# Patient Record
Sex: FEMALE | Race: WHITE
[De-identification: names, ages, dates, MRNs, and addresses within clinical notes are randomized per-mention and may not be internally consistent; named-entity substitution may affect disease eponyms.]

---

## 2018-03-12 ENCOUNTER — HOSPITAL ENCOUNTER (EMERGENCY)
Dept: HOSPITAL 54 - ER | Age: 83
LOS: 1 days | Discharge: HOME | End: 2018-03-13
Payer: MEDICARE

## 2018-03-12 VITALS — HEIGHT: 62 IN | BODY MASS INDEX: 25.76 KG/M2 | WEIGHT: 140 LBS

## 2018-03-12 DIAGNOSIS — R05: Primary | ICD-10-CM

## 2018-03-12 LAB
BASOPHILS # BLD AUTO: 0 /CMM (ref 0–0.2)
BASOPHILS NFR BLD AUTO: 0.2 % (ref 0–2)
BUN SERPL-MCNC: 12 MG/DL (ref 7–18)
CALCIUM SERPL-MCNC: 8.4 MG/DL (ref 8.5–10.1)
CHLORIDE SERPL-SCNC: 96 MMOL/L (ref 98–107)
CO2 SERPL-SCNC: 29 MMOL/L (ref 21–32)
CREAT SERPL-MCNC: 0.7 MG/DL (ref 0.6–1.3)
EOSINOPHIL # BLD AUTO: 0 /CMM (ref 0–0.7)
EOSINOPHIL NFR BLD AUTO: 0.4 % (ref 0–6)
GLUCOSE SERPL-MCNC: 137 MG/DL (ref 74–106)
HCT VFR BLD AUTO: 34 % (ref 33–45)
HGB BLD-MCNC: 11.6 G/DL (ref 11.5–14.8)
LYMPHOCYTES NFR BLD AUTO: 1.2 /CMM (ref 0.8–4.8)
LYMPHOCYTES NFR BLD AUTO: 14.7 % (ref 20–44)
MCH RBC QN AUTO: 28 PG (ref 26–33)
MCHC RBC AUTO-ENTMCNC: 34 G/DL (ref 31–36)
MCV RBC AUTO: 81 FL (ref 82–100)
MONOCYTES NFR BLD AUTO: 0.8 /CMM (ref 0.1–1.3)
MONOCYTES NFR BLD AUTO: 9.9 % (ref 2–12)
NEUTROPHILS # BLD AUTO: 6.2 /CMM (ref 1.8–8.9)
NEUTROPHILS NFR BLD AUTO: 74.8 % (ref 43–81)
PLATELET # BLD AUTO: 219 /CMM (ref 150–450)
POTASSIUM SERPL-SCNC: 3.7 MMOL/L (ref 3.5–5.1)
RBC # BLD AUTO: 4.19 MIL/UL (ref 4–5.2)
RDW COEFFICIENT OF VARIATION: 12.9 (ref 11.5–15)
SODIUM SERPL-SCNC: 133 MMOL/L (ref 136–145)
WBC NRBC COR # BLD AUTO: 8.3 K/UL (ref 4.3–11)

## 2018-03-12 PROCEDURE — Z7610: HCPCS

## 2018-03-12 PROCEDURE — 71045 X-RAY EXAM CHEST 1 VIEW: CPT

## 2018-03-12 PROCEDURE — 85025 COMPLETE CBC W/AUTO DIFF WBC: CPT

## 2018-03-12 PROCEDURE — 36415 COLL VENOUS BLD VENIPUNCTURE: CPT

## 2018-03-12 PROCEDURE — 80048 BASIC METABOLIC PNL TOTAL CA: CPT

## 2018-03-12 PROCEDURE — 87804 INFLUENZA ASSAY W/OPTIC: CPT

## 2018-03-12 PROCEDURE — 99285 EMERGENCY DEPT VISIT HI MDM: CPT

## 2018-03-12 PROCEDURE — A4606 OXYGEN PROBE USED W OXIMETER: HCPCS

## 2018-03-12 NOTE — NUR
BIB [facility], C/O OF SOB, COUGH FOR 3 WEEKS, NAD NOTED, VSS, RESP EVEN AND 
UNLABORED, PT IS ON OXYGEN VIA NC 2LPM, SAT 99%,  PT WAS PUT ON GOWN AND 
MONITOR, MD AT .

## 2018-03-13 VITALS — SYSTOLIC BLOOD PRESSURE: 135 MMHG | DIASTOLIC BLOOD PRESSURE: 41 MMHG

## 2019-01-28 ENCOUNTER — HOSPITAL ENCOUNTER (EMERGENCY)
Dept: HOSPITAL 54 - ER | Age: 84
Discharge: HOME | End: 2019-01-28
Payer: MEDICARE

## 2019-01-28 VITALS — BODY MASS INDEX: 23.3 KG/M2 | HEIGHT: 66 IN | WEIGHT: 145 LBS

## 2019-01-28 VITALS — SYSTOLIC BLOOD PRESSURE: 158 MMHG | DIASTOLIC BLOOD PRESSURE: 72 MMHG

## 2019-01-28 DIAGNOSIS — R07.89: ICD-10-CM
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
## 2019-01-28 DIAGNOSIS — S01.01XA: Primary | ICD-10-CM
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
## 2019-01-28 DIAGNOSIS — R55: ICD-10-CM
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
## 2019-01-28 LAB
BASOPHILS # BLD AUTO: 0.1 /CMM (ref 0–0.2)
BASOPHILS NFR BLD AUTO: 0.8 % (ref 0–2)
BUN SERPL-MCNC: 19 MG/DL (ref 7–18)
CALCIUM SERPL-MCNC: 8.4 MG/DL (ref 8.5–10.1)
CHLORIDE SERPL-SCNC: 102 MMOL/L (ref 98–107)
CO2 SERPL-SCNC: 29 MMOL/L (ref 21–32)
CREAT SERPL-MCNC: 0.8 MG/DL (ref 0.6–1.3)
EOSINOPHIL NFR BLD AUTO: 0.4 % (ref 0–6)
GLUCOSE SERPL-MCNC: 140 MG/DL (ref 74–106)
HCT VFR BLD AUTO: 38 % (ref 33–45)
HGB BLD-MCNC: 12.5 G/DL (ref 11.5–14.8)
LYMPHOCYTES NFR BLD AUTO: 2 /CMM (ref 0.8–4.8)
LYMPHOCYTES NFR BLD AUTO: 21.4 % (ref 20–44)
MCHC RBC AUTO-ENTMCNC: 32 G/DL (ref 31–36)
MCV RBC AUTO: 83 FL (ref 82–100)
MONOCYTES NFR BLD AUTO: 0.8 /CMM (ref 0.1–1.3)
MONOCYTES NFR BLD AUTO: 7.9 % (ref 2–12)
NEUTROPHILS # BLD AUTO: 6.6 /CMM (ref 1.8–8.9)
NEUTROPHILS NFR BLD AUTO: 69.5 % (ref 43–81)
PLATELET # BLD AUTO: 189 /CMM (ref 150–450)
POTASSIUM SERPL-SCNC: 4.4 MMOL/L (ref 3.5–5.1)
RBC # BLD AUTO: 4.61 MIL/UL (ref 4–5.2)
SODIUM SERPL-SCNC: 139 MMOL/L (ref 136–145)
WBC NRBC COR # BLD AUTO: 9.5 K/UL (ref 4.3–11)

## 2019-01-28 PROCEDURE — 70450 CT HEAD/BRAIN W/O DYE: CPT

## 2019-01-28 PROCEDURE — 12002 RPR S/N/AX/GEN/TRNK2.6-7.5CM: CPT

## 2019-01-28 PROCEDURE — 84484 ASSAY OF TROPONIN QUANT: CPT

## 2019-01-28 PROCEDURE — 90471 IMMUNIZATION ADMIN: CPT

## 2019-01-28 PROCEDURE — 99284 EMERGENCY DEPT VISIT MOD MDM: CPT

## 2019-01-28 PROCEDURE — 90715 TDAP VACCINE 7 YRS/> IM: CPT

## 2019-01-28 PROCEDURE — 80048 BASIC METABOLIC PNL TOTAL CA: CPT

## 2019-01-28 PROCEDURE — 93005 ELECTROCARDIOGRAM TRACING: CPT

## 2019-01-28 PROCEDURE — 71045 X-RAY EXAM CHEST 1 VIEW: CPT

## 2019-01-28 PROCEDURE — 85025 COMPLETE CBC W/AUTO DIFF WBC: CPT

## 2019-01-28 PROCEDURE — 36415 COLL VENOUS BLD VENIPUNCTURE: CPT

## 2019-01-28 PROCEDURE — 85730 THROMBOPLASTIN TIME PARTIAL: CPT

## 2019-01-28 PROCEDURE — A6402 STERILE GAUZE <= 16 SQ IN: HCPCS

## 2019-01-28 NOTE — NUR
PT BIB [facility] WITH A C/O SLIP AND FALL IN THE BATHTUB. PT HAS A LAC ON BACK OF 
HEAD. - KO. PT'S CAREGIVER STATED THAT THE PT WAS WASHED AND DRESSED. CAREGIVER 
WAS DRYING PT'S HAIR, WHEN THE PHONE RANG. THE CAREGIVER STEPPED AWAY FOR A 
MOMENT TO GET THE PHONE AND THE PT TRIED TO GET INTO THE BATH AND SLIPPED. PT 
DENIES ANY NECK OR BACK PAIN. PT IS ABLE TO MOVE ALL EXTREMITIES. NO SHORTENING 
NOTED. PT IS SLIGHTLY HYPERTENSIVE.

## 2019-01-28 NOTE — NUR
PT LEFT VIA WC. VSS. NAD NOTED. NO EMESIS NOTED. Patient discharged to home in 
stable condition. Written and verbal after care instructions given. Patient's 
daughter verbalizes understanding of instruction.

## 2019-10-18 ENCOUNTER — HOSPITAL ENCOUNTER (INPATIENT)
Dept: HOSPITAL 54 - ER | Age: 84
LOS: 4 days | Discharge: NURSING, REHAB OR LONG-TERM CARE | DRG: 193 | End: 2019-10-22
Attending: FAMILY MEDICINE | Admitting: NURSE PRACTITIONER
Payer: MEDICARE

## 2019-10-18 VITALS — HEIGHT: 61 IN | WEIGHT: 157 LBS | BODY MASS INDEX: 29.64 KG/M2

## 2019-10-18 DIAGNOSIS — N39.0: ICD-10-CM

## 2019-10-18 DIAGNOSIS — R50.9: ICD-10-CM

## 2019-10-18 DIAGNOSIS — D72.829: ICD-10-CM

## 2019-10-18 DIAGNOSIS — J15.9: Primary | ICD-10-CM

## 2019-10-18 DIAGNOSIS — K21.9: ICD-10-CM

## 2019-10-18 DIAGNOSIS — Z87.891: ICD-10-CM

## 2019-10-18 DIAGNOSIS — F03.90: ICD-10-CM

## 2019-10-18 DIAGNOSIS — B96.20: ICD-10-CM

## 2019-10-18 DIAGNOSIS — J45.909: ICD-10-CM

## 2019-10-18 DIAGNOSIS — I10: ICD-10-CM

## 2019-10-18 DIAGNOSIS — G93.41: ICD-10-CM

## 2019-10-18 DIAGNOSIS — E44.1: ICD-10-CM

## 2019-10-18 DIAGNOSIS — E88.09: ICD-10-CM

## 2019-10-18 DIAGNOSIS — B95.1: ICD-10-CM

## 2019-10-18 DIAGNOSIS — E03.9: ICD-10-CM

## 2019-10-18 PROCEDURE — G0378 HOSPITAL OBSERVATION PER HR: HCPCS

## 2019-10-18 PROCEDURE — A4216 STERILE WATER/SALINE, 10 ML: HCPCS

## 2019-10-18 NOTE — NUR
TITO FROM HOME. TO ER BED 4. ROCKY AT BEDSIDE FOR INFORMATION. PT IS 
ALERT AND AWAKE. BROUGHT IN FOR C/O SOB. ACCORDING TO ROCKY PT HAS BEEN 
HAVING SOB AND CNOPN PRODUCTIVE COUGH FOR THE PAST 4-5 DAYS. ROCKY REPORTS 
THAT SHE HAS BEEN GIVING THE NEBULIZER BREATHIGNTX BUT IS NOT EFFECTIVE. NO 
RESP DISTRESS NOTED DURING ASSESSMENT, SATTING 100% ON RA, BREATHING EVEN AND 
UNLABORED. PT IS FEBRILE @ 101.9  AWAITING MD FOR EVAL.

## 2019-10-18 NOTE — NUR
-------------------------------------------------------------------------------

          *** Note undone in EDM - 10/18/19 at 2339 by BEAR ***          

-------------------------------------------------------------------------------

BIBFAMILY FROM HOME. TO ER BED 4. ROCKY AT BEDSIDE FOR INFORMATION. PT IS 
ALERT AND AWAKE. BROUGHT IN FOR C/O SOB. ACCORDING TO ROCKY PT HAS BEEN 
HAVING SOB AND CNOPN PRODUCTIVE COUGH FOR THE PAST 4-5 DAYS. ROCKY REPORTS 
THAT SHE HAS BEEN GIVING THE NEBULIZER BREATHIGNTX BUT IS NOT EFFECTIVE. NO 
RESP DISTRESS NOTED DURING ASSESSMENT, SATTING 100% ON RA, BREATHING EVEN AND 
UNLABORED. AWAITING MD FOR EVAL.

## 2019-10-18 NOTE — NUR
TITO FROM HOME. TO ER BED 4. ROCKY AT BEDSIDE FOR INFORMATION. PT IS 
ALERT AND AWAKE. BROUGHT IN FOR C/O SOB. ACCORDING TO ROCKY PT HAS BEEN 
HAVING SOB AND CNOPN PRODUCTIVE COUGH FOR THE PAST 4-5 DAYS. ROCKY REPORTS 
THAT SHE HAS BEEN GIVING THE NEBULIZER BREATHIGNTX BUT IS NOT EFFECTIVE. NO 
RESP DISTRESS NOTED DURING ASSESSMENT, SATTING 100% ON RA, BREATHING EVEN AND 
UNLABORED. AWAITING MD FOR EVAL.

## 2019-10-19 VITALS — DIASTOLIC BLOOD PRESSURE: 70 MMHG | SYSTOLIC BLOOD PRESSURE: 129 MMHG

## 2019-10-19 VITALS — DIASTOLIC BLOOD PRESSURE: 79 MMHG | SYSTOLIC BLOOD PRESSURE: 142 MMHG

## 2019-10-19 VITALS — SYSTOLIC BLOOD PRESSURE: 142 MMHG | DIASTOLIC BLOOD PRESSURE: 78 MMHG

## 2019-10-19 VITALS — SYSTOLIC BLOOD PRESSURE: 155 MMHG | DIASTOLIC BLOOD PRESSURE: 80 MMHG

## 2019-10-19 VITALS — DIASTOLIC BLOOD PRESSURE: 80 MMHG | SYSTOLIC BLOOD PRESSURE: 155 MMHG

## 2019-10-19 VITALS — DIASTOLIC BLOOD PRESSURE: 78 MMHG | SYSTOLIC BLOOD PRESSURE: 142 MMHG

## 2019-10-19 VITALS — DIASTOLIC BLOOD PRESSURE: 76 MMHG | SYSTOLIC BLOOD PRESSURE: 138 MMHG

## 2019-10-19 VITALS — SYSTOLIC BLOOD PRESSURE: 129 MMHG | DIASTOLIC BLOOD PRESSURE: 70 MMHG

## 2019-10-19 LAB
ALBUMIN SERPL BCP-MCNC: 2.9 G/DL (ref 3.4–5)
ALP SERPL-CCNC: 95 U/L (ref 46–116)
ALT SERPL W P-5'-P-CCNC: 11 U/L (ref 12–78)
APPEARANCE UR: (no result)
AST SERPL W P-5'-P-CCNC: 17 U/L (ref 15–37)
BASOPHILS # BLD AUTO: 0 /CMM (ref 0–0.2)
BASOPHILS # BLD AUTO: 0.2 /CMM (ref 0–0.2)
BASOPHILS NFR BLD AUTO: 0.2 % (ref 0–2)
BASOPHILS NFR BLD AUTO: 1.1 % (ref 0–2)
BILIRUB DIRECT SERPL-MCNC: 0.1 MG/DL (ref 0–0.2)
BILIRUB SERPL-MCNC: 0.4 MG/DL (ref 0.2–1)
BILIRUB UR QL STRIP: NEGATIVE
BUN SERPL-MCNC: 14 MG/DL (ref 7–18)
BUN SERPL-MCNC: 17 MG/DL (ref 7–18)
CALCIUM SERPL-MCNC: 8.8 MG/DL (ref 8.5–10.1)
CALCIUM SERPL-MCNC: 8.8 MG/DL (ref 8.5–10.1)
CHLORIDE SERPL-SCNC: 103 MMOL/L (ref 98–107)
CHLORIDE SERPL-SCNC: 103 MMOL/L (ref 98–107)
CHOLEST SERPL-MCNC: 154 MG/DL (ref ?–200)
CO2 SERPL-SCNC: 27 MMOL/L (ref 21–32)
CO2 SERPL-SCNC: 28 MMOL/L (ref 21–32)
COLOR UR: YELLOW
CREAT SERPL-MCNC: 0.8 MG/DL (ref 0.6–1.3)
CREAT SERPL-MCNC: 0.9 MG/DL (ref 0.6–1.3)
EOSINOPHIL NFR BLD AUTO: 0 % (ref 0–6)
EOSINOPHIL NFR BLD AUTO: 1.2 % (ref 0–6)
GLUCOSE SERPL-MCNC: 125 MG/DL (ref 74–106)
GLUCOSE SERPL-MCNC: 196 MG/DL (ref 74–106)
GLUCOSE UR STRIP-MCNC: NEGATIVE MG/DL
HCT VFR BLD AUTO: 34 % (ref 33–45)
HCT VFR BLD AUTO: 35 % (ref 33–45)
HDLC SERPL-MCNC: 74 MG/DL (ref 40–60)
HGB BLD-MCNC: 11.1 G/DL (ref 11.5–14.8)
HGB BLD-MCNC: 11.2 G/DL (ref 11.5–14.8)
HGB UR QL STRIP: (no result) ERY/UL
KETONES UR STRIP-MCNC: NEGATIVE MG/DL
LDLC SERPL DIRECT ASSAY-MCNC: 65 MG/DL (ref 0–99)
LEUKOCYTE ESTERASE UR QL STRIP: (no result)
LYMPHOCYTES NFR BLD AUTO: 0.8 /CMM (ref 0.8–4.8)
LYMPHOCYTES NFR BLD AUTO: 1.8 /CMM (ref 0.8–4.8)
LYMPHOCYTES NFR BLD AUTO: 12.7 % (ref 20–44)
LYMPHOCYTES NFR BLD AUTO: 6 % (ref 20–44)
MAGNESIUM SERPL-MCNC: 2 MG/DL (ref 1.8–2.4)
MCHC RBC AUTO-ENTMCNC: 32 G/DL (ref 31–36)
MCHC RBC AUTO-ENTMCNC: 33 G/DL (ref 31–36)
MCV RBC AUTO: 82 FL (ref 82–100)
MCV RBC AUTO: 82 FL (ref 82–100)
MONOCYTES NFR BLD AUTO: 0.3 /CMM (ref 0.1–1.3)
MONOCYTES NFR BLD AUTO: 1.2 /CMM (ref 0.1–1.3)
MONOCYTES NFR BLD AUTO: 2.1 % (ref 2–12)
MONOCYTES NFR BLD AUTO: 8.3 % (ref 2–12)
NEUTROPHILS # BLD AUTO: 10.9 /CMM (ref 1.8–8.9)
NEUTROPHILS # BLD AUTO: 12 /CMM (ref 1.8–8.9)
NEUTROPHILS NFR BLD AUTO: 76.7 % (ref 43–81)
NEUTROPHILS NFR BLD AUTO: 91.7 % (ref 43–81)
NITRITE UR QL STRIP: NEGATIVE
NT-PROBNP SERPL-MCNC: 417 PG/ML (ref 0–125)
PH UR STRIP: 6 [PH] (ref 5–8)
PHOSPHATE SERPL-MCNC: 3.1 MG/DL (ref 2.5–4.9)
PLATELET # BLD AUTO: 220 /CMM (ref 150–450)
PLATELET # BLD AUTO: 225 /CMM (ref 150–450)
POTASSIUM SERPL-SCNC: 3.7 MMOL/L (ref 3.5–5.1)
POTASSIUM SERPL-SCNC: 4 MMOL/L (ref 3.5–5.1)
PROT SERPL-MCNC: 7.5 G/DL (ref 6.4–8.2)
PROT UR QL STRIP: (no result) MG/DL
RBC # BLD AUTO: 4.18 MIL/UL (ref 4–5.2)
RBC # BLD AUTO: 4.24 MIL/UL (ref 4–5.2)
RBC #/AREA URNS HPF: (no result) /HPF (ref 0–2)
SODIUM SERPL-SCNC: 138 MMOL/L (ref 136–145)
SODIUM SERPL-SCNC: 139 MMOL/L (ref 136–145)
TRIGL SERPL-MCNC: 41 MG/DL (ref 30–150)
UROBILINOGEN UR STRIP-MCNC: 0.2 EU/DL
WBC #/AREA URNS HPF: (no result) /HPF (ref 0–3)
WBC NRBC COR # BLD AUTO: 13.1 K/UL (ref 4.3–11)
WBC NRBC COR # BLD AUTO: 14.3 K/UL (ref 4.3–11)

## 2019-10-19 RX ADMIN — PANTOPRAZOLE SODIUM SCH MG: 40 TABLET, DELAYED RELEASE ORAL at 12:08

## 2019-10-19 RX ADMIN — DONEPEZIL HYDROCHLORIDE SCH MG: 5 TABLET ORAL at 12:08

## 2019-10-19 RX ADMIN — Medication SCH MG: at 12:08

## 2019-10-19 RX ADMIN — LEVOTHYROXINE SODIUM SCH MCG: 88 TABLET ORAL at 12:08

## 2019-10-19 RX ADMIN — THERA TABS SCH UDTAB: TAB at 12:08

## 2019-10-19 NOTE — NUR
MS/RN  NOTE



BLOOD PRESSURE IS NOTED 155/80 AND PULSE 78. PRN NORVASC 50MG PO IS GIVEN. WILL CONTINUE TO 
MONITOR. THE PATIENT IS IN NO APPARENT DISTRESS AT THIS TIME.

## 2019-10-19 NOTE — NUR
TELE RN NOTES



Patient attempted to get off the bed without using the call light. Nurses alerted. Patient 
wanted to walk to the bathroom. Attempted to assist the patient to get out of bed. Patient 
complained she is feeling dizzy. Patient noted unable to stand despite the assistance. 
Provided bedside commode. Utilized 2-person assist. Patient able to sit on commode, no 
output noted. Assisted patient back to bed comfortably. On fall precautions. Will continue 
to monitor accordingly.

## 2019-10-19 NOTE — NUR
MS/RN  NOTE



THE PATIENT IS ALERT AND ORIENTED X1. IN ROOM AIR AND SATURATION IS AT 97%. DENIES SOB. 
RESPIRATION REGULAR AND UNLABORED. DENIES PAIN. THE PATIENT IN NO APPARENT DISTRESS.  RIGHT 
HAND G 20 PATENT AND SALINE LOCKED. BED LOW AND LOCKED. SIDE RAILS UP X3. BED ALARM ON. CALL 
LIGHT WITHIN REACH. WILL CONTINUE TO MONITOR.

## 2019-10-19 NOTE — NUR
MS/RN  NOTE



RECHECKED BLOOD PRESSURE, PULSE  AND NOTED 138/76 AND PULSE 75. THE PATIENT IN NO APPARENT 
DISTRESS.

## 2019-10-19 NOTE — NUR
TELE RN CLOSING NOTES



Patient asleep on bed, on tele monitor, NSR noted. On RA, no SOB/respiratory distress noted. 
No s/sx of discomfort noted at this time. With peripheral IV line R hand G#20 SL, flushed 
with NS accordingly, no s/sx of infiltration noted. Monitored patient constantly and PRN. On 
fall precautions, call light within easy reach. Endorsed to the next shift.

## 2019-10-19 NOTE — NUR
MS RN OPENING NOTES



RECEIVED PATIENT FROM MORNING SHIFT, ALERT AND ORIENTED X 1-2 Turkmen SPEAKING. AFEBRILE 
WITH NO S/S OF DISTRESS OBSERVED. BREATHING REGULAR AND UNLABORED ON ROOM AIR. RIGHT HAND 
G20 IV LINE INTACT AND PATENT WITH NO BLEEDING AND S/S OF INFECTION/INFILTRATION NOTED. SEEN 
WITH BILATERAL LOWER EXTREMITIES +3 EDEMA, BOTH LEGS KEPT ELEVATED. BRP, ASSISTED TO SIT ON 
THE BEDSIDE COMMODE WITH CLEAR YELLOW URINE IN MODERATE AMOUNT. BED LOW AND LOCKED ON SEMI 
FOWLERS POSITION. CALL LIGHT IN REACH. WILL CONTINUE TO MONITOR.

## 2019-10-19 NOTE — NUR
TELE/RN  NOTE



THE PATIENT IS RECEIVED IN BED. PATIENT IS AWAKE, ALERT AND ORIENTED TO SELF. ABLE TO MAKE 
NEEDS KNOWN VERBALLY. THE PATIENT IS IN NO APPARENT DISTRESS. DENIES PAIN. IN ROOM AIR AND 
DENIES SOB.RIGHT HAND G 20 PATENT AND SALINE LOCKED. BED LOW AND LOCKED. SIDE RAILS UP X3. 
CALL LIGHT WITHIN REACH. WILL CONTINUE TO MONITOR.

## 2019-10-19 NOTE — NUR
TELE RN ADMISSION NOTES



Received patient from ER, via rney, accompanied by 2 ER staff. Admitted to telemetry 309-2 
due to Sepsis under the service of IAM Jeffery. Assisted patient to bed comfortably. Patient 
noted unable to ambulate. Admission routine done. Patient belongings inventory completed by 
the assigned CNA. Daughter at bedside for information. Initial skin assessment done, photos 
taken and documented. With Levaquin on going, to consumed as ordered. Put on tele monitor 
with NSR noted.  Kept patient on bed clean, dry and comfortable. Instructed on the use of 
call light and emphasized to patient and family the importance of calling for help when 
getting off the bed. Admission orders noted and carried out. Elevated bilateral heel with a 
pillow. Patient needs reinforcement. Bed alarm on, on fall precautions. Call light at 
bedside. Will continue to monitor accordingly.

## 2019-10-19 NOTE — NUR
MS/RN  NOTE



RECEIVED ORDER FROM VIJAY JOHNSTON FOR PT EVAL ORDER. THE ORDER IS READ BACK, VERIFIED. NOTED AND 
CARRIED OUT.

## 2019-10-20 VITALS — SYSTOLIC BLOOD PRESSURE: 139 MMHG | DIASTOLIC BLOOD PRESSURE: 69 MMHG

## 2019-10-20 VITALS — DIASTOLIC BLOOD PRESSURE: 69 MMHG | SYSTOLIC BLOOD PRESSURE: 138 MMHG

## 2019-10-20 VITALS — DIASTOLIC BLOOD PRESSURE: 69 MMHG | SYSTOLIC BLOOD PRESSURE: 124 MMHG

## 2019-10-20 LAB
BUN SERPL-MCNC: 15 MG/DL (ref 7–18)
CALCIUM SERPL-MCNC: 8.9 MG/DL (ref 8.5–10.1)
CHLORIDE SERPL-SCNC: 108 MMOL/L (ref 98–107)
CO2 SERPL-SCNC: 30 MMOL/L (ref 21–32)
CREAT SERPL-MCNC: 0.8 MG/DL (ref 0.6–1.3)
GLUCOSE SERPL-MCNC: 126 MG/DL (ref 74–106)
POTASSIUM SERPL-SCNC: 4.5 MMOL/L (ref 3.5–5.1)
SODIUM SERPL-SCNC: 146 MMOL/L (ref 136–145)

## 2019-10-20 RX ADMIN — DONEPEZIL HYDROCHLORIDE SCH MG: 5 TABLET ORAL at 09:05

## 2019-10-20 RX ADMIN — PANTOPRAZOLE SODIUM SCH MG: 40 TABLET, DELAYED RELEASE ORAL at 09:05

## 2019-10-20 RX ADMIN — Medication SCH MG: at 09:05

## 2019-10-20 RX ADMIN — LEVOTHYROXINE SODIUM SCH MCG: 88 TABLET ORAL at 09:06

## 2019-10-20 RX ADMIN — THERA TABS SCH UDTAB: TAB at 09:05

## 2019-10-20 NOTE — NUR
Patient resting in bed. On RA, on room air with no distress noted. Denies pain at this time. 
With peripheral IV line R hand G#20 SL, flushed with NS . Patient prefer to use BSC , 
moderate assistance needed , on fall precautions, call light within easy reach. Endorsed to 
the next shift.

## 2019-10-20 NOTE — NUR
MS RN NOTES



PATIENT VERBALIZED WANTING TO SLEEP, AMBIEN GIVEN BY MOUTH. WILL CONTINUE TO MONITOR.

## 2019-10-20 NOTE — NUR
RN INITIAL NOTES:

RECEIVED REPORT FROM JUN NGUYỄN. PT IN BED, AWAKE, A/O X1, CONFUSED, BASELINE PER DAUGHTER. 
PT ON FALL PRECAUTIONS. NOTED IV ACCESS LEAKING, WILL START A NEW ONE. RESPIRATIONS EVEN AND 
UNLABORED, NO FACIAL GRIMACE NOTED. APPEARS CLAM AND COMFORTABLE, BED SIDE COMMODE 
AVAILABLE. SAFETY PRECAUTIONS FOR FALL INITIATED CALL LIGHT IN REACH, WILL CONTINUE 
MONITORING PT.

## 2019-10-20 NOTE — NUR
MS RN CLOSING NOTES



PATIENT IN BED ALERT AND ORIENTED X 1 Angolan SPEAKING. AFEBRILE WITH NO S/S OF DISTRESS 
OBSERVED. BREATHING REGULAR AND UNLABORED ON ROOM AIR. RIGHT HAND G20 IV LINE INTACT AND 
PATENT WITH NO BLEEDING AND S/S OF INFECTION/INFILTRATION NOTED. BRP WITH ASSIST. BED LOW 
AND LOCKED ON SEMI FOWLERS POSITION. CALL LIGHT IN REACH. WILL ENDORSE TO MORNING SHIFT FOR 
LEANNA.

## 2019-10-21 VITALS — DIASTOLIC BLOOD PRESSURE: 71 MMHG | SYSTOLIC BLOOD PRESSURE: 127 MMHG

## 2019-10-21 VITALS — SYSTOLIC BLOOD PRESSURE: 147 MMHG | DIASTOLIC BLOOD PRESSURE: 67 MMHG

## 2019-10-21 VITALS — DIASTOLIC BLOOD PRESSURE: 67 MMHG | SYSTOLIC BLOOD PRESSURE: 147 MMHG

## 2019-10-21 VITALS — SYSTOLIC BLOOD PRESSURE: 141 MMHG | DIASTOLIC BLOOD PRESSURE: 91 MMHG

## 2019-10-21 LAB
BASOPHILS # BLD AUTO: 0 /CMM (ref 0–0.2)
BASOPHILS NFR BLD AUTO: 0.3 % (ref 0–2)
BUN SERPL-MCNC: 16 MG/DL (ref 7–18)
CALCIUM SERPL-MCNC: 8.3 MG/DL (ref 8.5–10.1)
CHLORIDE SERPL-SCNC: 106 MMOL/L (ref 98–107)
CO2 SERPL-SCNC: 30 MMOL/L (ref 21–32)
CREAT SERPL-MCNC: 0.8 MG/DL (ref 0.6–1.3)
EOSINOPHIL NFR BLD AUTO: 2.8 % (ref 0–6)
GLUCOSE SERPL-MCNC: 109 MG/DL (ref 74–106)
HCT VFR BLD AUTO: 34 % (ref 33–45)
HGB BLD-MCNC: 11 G/DL (ref 11.5–14.8)
LYMPHOCYTES NFR BLD AUTO: 1.6 /CMM (ref 0.8–4.8)
LYMPHOCYTES NFR BLD AUTO: 13.7 % (ref 20–44)
MCHC RBC AUTO-ENTMCNC: 33 G/DL (ref 31–36)
MCV RBC AUTO: 81 FL (ref 82–100)
MONOCYTES NFR BLD AUTO: 1.2 /CMM (ref 0.1–1.3)
MONOCYTES NFR BLD AUTO: 10.2 % (ref 2–12)
NEUTROPHILS # BLD AUTO: 8.3 /CMM (ref 1.8–8.9)
NEUTROPHILS NFR BLD AUTO: 73 % (ref 43–81)
PLATELET # BLD AUTO: 253 /CMM (ref 150–450)
POTASSIUM SERPL-SCNC: 4.3 MMOL/L (ref 3.5–5.1)
RBC # BLD AUTO: 4.18 MIL/UL (ref 4–5.2)
SODIUM SERPL-SCNC: 143 MMOL/L (ref 136–145)
WBC NRBC COR # BLD AUTO: 11.4 K/UL (ref 4.3–11)

## 2019-10-21 RX ADMIN — LEVOTHYROXINE SODIUM SCH MCG: 88 TABLET ORAL at 09:04

## 2019-10-21 RX ADMIN — DEXTROSE MONOHYDRATE SCH MLS/HR: 50 INJECTION, SOLUTION INTRAVENOUS at 11:22

## 2019-10-21 RX ADMIN — DONEPEZIL HYDROCHLORIDE SCH MG: 5 TABLET ORAL at 09:04

## 2019-10-21 RX ADMIN — Medication SCH EACH: at 17:56

## 2019-10-21 RX ADMIN — Medication SCH MG: at 09:04

## 2019-10-21 RX ADMIN — PANTOPRAZOLE SODIUM SCH MG: 40 TABLET, DELAYED RELEASE ORAL at 09:05

## 2019-10-21 RX ADMIN — THERA TABS SCH UDTAB: TAB at 09:04

## 2019-10-21 NOTE — NUR
rn closing notes:

pt in bed, sleeping, remains on 2l oxygen via nc, respirations even and unlabored. no facial 
grimace noted. iv access remains patent and flushing well, on hl. vs remains stable, needs 
attended. Safety precautions for fall remains engaged, call light in reach, will endorse to 
day rn for continuity of care.

## 2019-10-21 NOTE — NUR
MS/RN  NOTE



VANCOMYCIN 1 GM DUE AT 1000 IS ADMINISTERED AT 1122 BECAUSE THE PHARMACY`S LATE DELIVERY. 
PHARMACIST GALINA IS AWARE.

## 2019-10-21 NOTE — NUR
MS RN OPENING NOTE 



RECEIVED PATIENT IN BED. A/OX1. Grenadian SPEAKING, ORIENTED TO CALL LIGHT, ABLE TO MAKE 
BASIC NEEDS KNOWN. TOLERATING ROOM AIR. RESPIRATIONS ARE EVEN AND UNLABORED. NO S/S SOB 
NOTED. DOESNT EXHIBIT AND S/S OF PAIN OR DISCOMFORT. IN NO APPARENT DISTRESS. IV ACCESS IN 
LFA#22 PATENT AND SALINE LOCKED. BED IS LOW AND LOCKED, SIDE RAILS UP X2, HOB ELEVATED 80 
DEGREES. CALL LIGHT WITHIN REACH. WILL CONTINUE TO MONITOR.

## 2019-10-21 NOTE — NUR
MS/RN  NOTE



THE PATIENT IS RECEIVED IN BED. PATIENT IS ALERT AND ORIENTED X1. DENIES PAIN. IN ROOM AIR 
AND DENIES SOB. RESPIRATION REGULAR AND UNLABORED. LFA G 20 PATENT AND SALINE LOCKED. BED 
LOW AND LOCKED. SIDE RAILS UP X3. CALL LIGHT WITHIN REACH. WILL CONTINUE TO MONITOR.

## 2019-10-21 NOTE — NUR
MS/RN  NOTE



THE PATIENT IS ALERT AND ORIENTED X1. IN ROOM AIR AND SATURATION IS AT 93%. DENIES SOB. 
RESPIRATION REGULAR AND UNLABORED. DENIES PAIN. THE PATIENT IN NO APPARENT DISTRESS. LFA G 
22 PATENT AND SALINE LOCKED. PATIENT IN STABLE CONDITION. BED LOW AND LOCKED. SIDE RAILS UP 
X3. BED ALARM ON. CALL LIGHT WITHIN REACH. WILL ENDORSE TO NIGHT SHIFT.

## 2019-10-22 VITALS — DIASTOLIC BLOOD PRESSURE: 51 MMHG | SYSTOLIC BLOOD PRESSURE: 123 MMHG

## 2019-10-22 VITALS — SYSTOLIC BLOOD PRESSURE: 134 MMHG | DIASTOLIC BLOOD PRESSURE: 72 MMHG

## 2019-10-22 VITALS — DIASTOLIC BLOOD PRESSURE: 75 MMHG | SYSTOLIC BLOOD PRESSURE: 154 MMHG

## 2019-10-22 VITALS — DIASTOLIC BLOOD PRESSURE: 82 MMHG | SYSTOLIC BLOOD PRESSURE: 142 MMHG

## 2019-10-22 LAB
BASOPHILS # BLD AUTO: 0 /CMM (ref 0–0.2)
BASOPHILS NFR BLD AUTO: 0.2 % (ref 0–2)
BUN SERPL-MCNC: 12 MG/DL (ref 7–18)
CALCIUM SERPL-MCNC: 8.3 MG/DL (ref 8.5–10.1)
CHLORIDE SERPL-SCNC: 105 MMOL/L (ref 98–107)
CO2 SERPL-SCNC: 29 MMOL/L (ref 21–32)
CREAT SERPL-MCNC: 0.8 MG/DL (ref 0.6–1.3)
EOSINOPHIL NFR BLD AUTO: 6.6 % (ref 0–6)
GLUCOSE SERPL-MCNC: 126 MG/DL (ref 74–106)
HCT VFR BLD AUTO: 34 % (ref 33–45)
HGB BLD-MCNC: 11 G/DL (ref 11.5–14.8)
LYMPHOCYTES NFR BLD AUTO: 1.7 /CMM (ref 0.8–4.8)
LYMPHOCYTES NFR BLD AUTO: 19.6 % (ref 20–44)
MCHC RBC AUTO-ENTMCNC: 32 G/DL (ref 31–36)
MCV RBC AUTO: 82 FL (ref 82–100)
MONOCYTES NFR BLD AUTO: 0.9 /CMM (ref 0.1–1.3)
MONOCYTES NFR BLD AUTO: 10.2 % (ref 2–12)
NEUTROPHILS # BLD AUTO: 5.5 /CMM (ref 1.8–8.9)
NEUTROPHILS NFR BLD AUTO: 63.4 % (ref 43–81)
PLATELET # BLD AUTO: 244 /CMM (ref 150–450)
POTASSIUM SERPL-SCNC: 3.9 MMOL/L (ref 3.5–5.1)
RBC # BLD AUTO: 4.21 MIL/UL (ref 4–5.2)
SODIUM SERPL-SCNC: 141 MMOL/L (ref 136–145)
WBC NRBC COR # BLD AUTO: 8.6 K/UL (ref 4.3–11)

## 2019-10-22 RX ADMIN — DONEPEZIL HYDROCHLORIDE SCH MG: 5 TABLET ORAL at 08:33

## 2019-10-22 RX ADMIN — THERA TABS SCH UDTAB: TAB at 08:33

## 2019-10-22 RX ADMIN — Medication SCH EACH: at 08:33

## 2019-10-22 RX ADMIN — Medication SCH EACH: at 17:03

## 2019-10-22 RX ADMIN — LEVOTHYROXINE SODIUM SCH MCG: 88 TABLET ORAL at 08:33

## 2019-10-22 RX ADMIN — DEXTROSE MONOHYDRATE SCH MLS/HR: 50 INJECTION, SOLUTION INTRAVENOUS at 04:05

## 2019-10-22 RX ADMIN — Medication SCH MG: at 08:33

## 2019-10-22 RX ADMIN — PANTOPRAZOLE SODIUM SCH MG: 40 TABLET, DELAYED RELEASE ORAL at 08:33

## 2019-10-22 NOTE — NUR
RN MS NOTES

PT IN BED, AWAKE, ALERT AND ORIENTED, NO COMPLAINT OF PAIN, NOT IN DISTRESS, CALL LIGHT 
WITHIN REACH, KEPT WARM AND COMFORTABLE, NEEDS ATTENDED.

## 2019-10-22 NOTE — NUR
RN DISCHARGE NOTES:



PICKED UP BY EMT PAT, ALL NEEDS ATTENDED, REPORT GIVEN, PATIENT IN STABLE CONDITION, NO 
SIGNS OF ACUTE DISTRESS, DENIES ANY PAIN OR DISCOMFORT.  DISCONTINUE IV ACCESS, KEPT WARM 
DRY AND COMFORTABLE, PULL UPS ON, PURPLE ROBE ON. DISCHARGED PATIENT ACCORDING TO HOSPITAL 
PROTOCOL. LEFT AT 2100 CHARGE NURSE AWARE.

## 2019-10-22 NOTE — NUR
RN NOTES



CALLED AND SPOKE TO DAUGHTER BOO ( [phone]) AS REQUESTED,  INFORMED HER REGARDING MOM 
/ PATIENT PICKED UP BY EMT, IN STABLE CONDITION, ALL NEEDS ATTENDED.

## 2019-10-22 NOTE — NUR
RN MS NOTES

PT IN BED, AWAKE, ALERT AND ORIENTED, NOT IN PAIN OR DISTRESS, DAUGHTER AT BEDSIDE, PM MEDS 
GIVEN AS ORDERED, PM CARE PROVIDED, ALL NEEDS ATTENDED.

## 2019-10-22 NOTE — NUR
RN NOTES



RECEIVED PATIENT AWAKE SITTING IN BED, SAFETY MEASURES IN PLACE, ASPIRATION PRECAUTION 
EMPHASIZE, CALL LIGHT WITHIN EASY REACH, AWAITING FOR  TO SNF, ALL NEEDS ANTICIPATED. 
WILL MONITOR ACCORDINGLY.

## 2019-10-22 NOTE — NUR
MS RN CLOSING NOTE 



PATIENT IN BED. A/OX1. Taiwanese SPEAKING. TOLERATING ROOM AIR. RESPIRATIONS ARE EVEN AND 
UNLABORED. NO SOB NOTED. NO S/S PAIN OR DISCOMFORT THROUGHOUT SHIFT. NO DISTRESS. IV ACCESS 
MAINTAINED LFA#22 PATENT AND SALINE LOCKED. BED REMAINS LOW AND LOCKED, SIDE RAILS UP X2, 
HOB ELEVATED 80 DEGREES. CALL LIGHT WITHIN REACH. WILL ENDORSE TO NEXT SHIFT FOR LEANNA.

## 2019-10-22 NOTE — NUR
RN MS NOTES

PT IN BED, AWAKE, ALERT AND ORIENTED, SEEN AND EXAMINED BY DR. RAMIREZ, PLAN OF CARE 
DISCUSSED WITH PT, VERBALIZED UNDERSTANDING, CALL LIGHT WITHIN REACH.